# Patient Record
Sex: MALE | Race: WHITE | Employment: FULL TIME | URBAN - METROPOLITAN AREA
[De-identification: names, ages, dates, MRNs, and addresses within clinical notes are randomized per-mention and may not be internally consistent; named-entity substitution may affect disease eponyms.]

---

## 2022-07-26 ENCOUNTER — TELEPHONE (OUTPATIENT)
Dept: FAMILY MEDICINE CLINIC | Age: 42
End: 2022-07-26

## 2022-07-26 NOTE — LETTER
[facility] PRIMARY CARE  [address] [place] [id number]  Dept: [phone]  Dept Fax: : [phone]      7/26/2022    Dear Che Mulligan,    I find it necessary to inform you that I must withdraw my professional commitment to you as a Primary Care physician due to a breakdown in the doctor/patient relationship. It is essential that you continue to receive medical care for your condition. Therefore, I recommend you make immediate arrangements with another physician to provide the needed care. For emergency medical services, please go to the nearest emergency room for treatment. If you wish, I will continue to treat your urgent medical needs which may develop for the following thirty (30) days from today's date. Enclosed is a form authorizing me to release a copy of your medical records to your new treating physician. I will forward your records promptly upon receipt of this form, signed by you, with completed name and address of the physician to receive your records. If you have any questions regarding the contents of this letter, you may reach me at my office during normal business hours.     Respectfully,        Miles Godfrey MD

## 2022-07-26 NOTE — TELEPHONE ENCOUNTER
Pt has scheduled multiple times between [facility] and [facility] and no-showed for initial appt. Can we discharge to avoid him continuing the schedule.